# Patient Record
Sex: FEMALE | Race: WHITE
[De-identification: names, ages, dates, MRNs, and addresses within clinical notes are randomized per-mention and may not be internally consistent; named-entity substitution may affect disease eponyms.]

---

## 2018-12-18 ENCOUNTER — HOSPITAL ENCOUNTER (EMERGENCY)
Dept: HOSPITAL 10 - FTE | Age: 38
LOS: 1 days | Discharge: HOME | End: 2018-12-19
Payer: MEDICAID

## 2018-12-18 ENCOUNTER — HOSPITAL ENCOUNTER (EMERGENCY)
Dept: HOSPITAL 91 - FTE | Age: 38
LOS: 1 days | Discharge: HOME | End: 2018-12-19
Payer: MEDICAID

## 2018-12-18 VITALS
WEIGHT: 214.51 LBS | WEIGHT: 214.51 LBS | BODY MASS INDEX: 39.47 KG/M2 | BODY MASS INDEX: 39.47 KG/M2 | HEIGHT: 62 IN | HEIGHT: 62 IN

## 2018-12-18 DIAGNOSIS — R10.2: ICD-10-CM

## 2018-12-18 DIAGNOSIS — N83.201: Primary | ICD-10-CM

## 2018-12-18 PROCEDURE — 80053 COMPREHEN METABOLIC PANEL: CPT

## 2018-12-18 PROCEDURE — 85610 PROTHROMBIN TIME: CPT

## 2018-12-18 PROCEDURE — 36415 COLL VENOUS BLD VENIPUNCTURE: CPT

## 2018-12-18 PROCEDURE — 85730 THROMBOPLASTIN TIME PARTIAL: CPT

## 2018-12-18 PROCEDURE — 76856 US EXAM PELVIC COMPLETE: CPT

## 2018-12-18 PROCEDURE — 81001 URINALYSIS AUTO W/SCOPE: CPT

## 2018-12-18 PROCEDURE — 85025 COMPLETE CBC W/AUTO DIFF WBC: CPT

## 2018-12-18 PROCEDURE — 83690 ASSAY OF LIPASE: CPT

## 2018-12-18 PROCEDURE — 81025 URINE PREGNANCY TEST: CPT

## 2018-12-18 PROCEDURE — 74176 CT ABD & PELVIS W/O CONTRAST: CPT

## 2018-12-18 PROCEDURE — 96374 THER/PROPH/DIAG INJ IV PUSH: CPT

## 2018-12-18 PROCEDURE — 76830 TRANSVAGINAL US NON-OB: CPT

## 2018-12-18 PROCEDURE — 76705 ECHO EXAM OF ABDOMEN: CPT

## 2018-12-18 PROCEDURE — 99285 EMERGENCY DEPT VISIT HI MDM: CPT

## 2018-12-19 VITALS — RESPIRATION RATE: 16 BRPM | DIASTOLIC BLOOD PRESSURE: 82 MMHG | HEART RATE: 73 BPM | SYSTOLIC BLOOD PRESSURE: 153 MMHG

## 2018-12-19 LAB
ADD MAN DIFF?: NO
ADD UMIC: YES
ALANINE AMINOTRANSFERASE: 19 IU/L (ref 13–69)
ALBUMIN/GLOBULIN RATIO: 1.2
ALBUMIN: 4.1 G/DL (ref 3.3–4.9)
ALKALINE PHOSPHATASE: 91 IU/L (ref 42–121)
ANION GAP: 11 (ref 5–13)
ASPARTATE AMINO TRANSFERASE: 14 IU/L (ref 15–46)
BASOPHIL #: 0 10^3/UL (ref 0–0.1)
BASOPHILS %: 0.3 % (ref 0–2)
BILIRUBIN,DIRECT: 0 MG/DL (ref 0–0.2)
BILIRUBIN,TOTAL: 0.2 MG/DL (ref 0.2–1.3)
BLOOD UREA NITROGEN: 15 MG/DL (ref 7–20)
CALCIUM: 9.3 MG/DL (ref 8.4–10.2)
CARBON DIOXIDE: 29 MMOL/L (ref 21–31)
CHLORIDE: 103 MMOL/L (ref 97–110)
CREATININE: 0.71 MG/DL (ref 0.44–1)
EOSINOPHILS #: 0.2 10^3/UL (ref 0–0.5)
EOSINOPHILS %: 1.6 % (ref 0–7)
GLOBULIN: 3.4 G/DL (ref 1.3–3.2)
GLUCOSE: 88 MG/DL (ref 70–220)
HEMATOCRIT: 39.2 % (ref 37–47)
HEMOGLOBIN: 12.7 G/DL (ref 12–16)
IMMATURE GRANS #M: 0.19 10^3/UL (ref 0–0.03)
IMMATURE GRANS % (M): 1.4 % (ref 0–0.43)
INR: 0.88
LIPASE: 68 U/L (ref 23–300)
LYMPHOCYTES #: 4 10^3/UL (ref 0.8–2.9)
LYMPHOCYTES %: 30.2 % (ref 15–51)
MEAN CORPUSCULAR HEMOGLOBIN: 28.2 PG (ref 29–33)
MEAN CORPUSCULAR HGB CONC: 32.4 G/DL (ref 32–37)
MEAN CORPUSCULAR VOLUME: 86.9 FL (ref 82–101)
MEAN PLATELET VOLUME: 10 FL (ref 7.4–10.4)
MONOCYTE #: 0.7 10^3/UL (ref 0.3–0.9)
MONOCYTES %: 5.3 % (ref 0–11)
NEUTROPHIL #: 8.1 10^3/UL (ref 1.6–7.5)
NEUTROPHILS %: 61.2 % (ref 39–77)
NUCLEATED RED BLOOD CELLS #: 0 10^3/UL (ref 0–0)
NUCLEATED RED BLOOD CELLS%: 0 /100WBC (ref 0–0)
PARTIAL THROMBOPLASTIN TIME: 27.5 SEC (ref 23–35)
PLATELET COUNT: 298 10^3/UL (ref 140–415)
POTASSIUM: 4 MMOL/L (ref 3.5–5.1)
PROTIME: 12 SEC (ref 11.9–14.9)
PT RATIO: 0.9
RED BLOOD COUNT: 4.51 10^6/UL (ref 4.2–5.4)
RED CELL DISTRIBUTION WIDTH: 13.5 % (ref 11.5–14.5)
SODIUM: 143 MMOL/L (ref 135–144)
TOTAL PROTEIN: 7.5 G/DL (ref 6.1–8.1)
UR ASCORBIC ACID: NEGATIVE MG/DL
UR BACTERIA: (no result) /HPF
UR BILIRUBIN (DIP): NEGATIVE MG/DL
UR BLOOD (DIP): (no result) MG/DL
UR CLARITY: (no result)
UR COLOR: YELLOW
UR GLUCOSE (DIP): NEGATIVE MG/DL
UR KETONES (DIP): NEGATIVE MG/DL
UR LEUKOCYTE ESTERASE (DIP): NEGATIVE LEU/UL
UR NITRITE (DIP): NEGATIVE MG/DL
UR PH (DIP): 5 (ref 5–9)
UR RBC: 2 /HPF (ref 0–5)
UR SPECIFIC GRAVITY (DIP): 1.02 (ref 1–1.03)
UR SQUAMOUS EPITHELIAL CELL: (no result) /HPF
UR TOTAL PROTEIN (DIP): NEGATIVE MG/DL
UR UROBILINOGEN (DIP): NEGATIVE MG/DL
UR WBC: 1 /HPF (ref 0–5)
WHITE BLOOD COUNT: 13.2 10^3/UL (ref 4.8–10.8)

## 2018-12-19 RX ADMIN — KETOROLAC TROMETHAMINE 1 MG: 15 INJECTION, SOLUTION INTRAMUSCULAR; INTRAVENOUS at 01:25

## 2018-12-19 RX ADMIN — THIAMINE HYDROCHLORIDE 1 MLS/HR: 100 INJECTION, SOLUTION INTRAMUSCULAR; INTRAVENOUS at 01:25

## 2018-12-19 NOTE — ERD
ER Documentation


Chief Complaint


Chief Complaint





RUQ RADIATES TO BACK; NO N/V/D/C; DENIES UTI S/S X2DAYS





HPI


Patient is a 38-year-old female with no significant past medical history 


presenting to the emergency department complaining of right upper quadrant pain 


intermittently for the past 5 days.  Pain is rated 8/10 in severity and 


ibuprofen provides relief.  Patient denies any nausea, vomiting, diarrhea, 


fevers, chills, or other symptoms at this time.





ROS


All systems reviewed and are negative except as per history of present illness.





Medications


Home Meds


Active Scripts


Ondansetron (Ondansetron Odt) 4 Mg Tab.rapdis, 4 MG PO Q6H PRN for NAUSEA AND/OR


VOMITING, #10 TAB


   Prov:HANS BENNETT PA-C         18


Naproxen* (Naprosyn*) 500 Mg Tablet, 500 MG PO BID PRN for PAIN AND/OR 


INFLAMMATION, #30 TAB


   Prov:HANS BENNETT PA-C         18


Hydrocodone/Acetaminophen (Norco 5-325 Tablet) 1 Each Tablet, 1 TAB PO Q6H PRN 


for PAIN, #7 TAB


   Prov:HANS BENNETT PA-C         18


Naproxen* (Naprosyn*) 500 Mg Tablet, 500 MG PO BID PRN for PAIN AND/OR 


INFLAMMATION, #30 TAB


   Prov:HANS THOMPSON         3/24/16


Reported Medications


Norethindrone-E.estradiol-Iron (Norethin-Estrad-Ferr 1-0.02 mg) 1 Each Tablet, 1


TAB PO DAILY


   3/23/16





Allergies


Allergies:  


Coded Allergies:  


     No Known Allergy (Unverified , 16)





PMhx/Soc


History of Surgery:  Yes ()


Anesthesia Reaction:  No


Hx Neurological Disorder:  No


Hx Respiratory Disorders:  No


Hx Cardiac Disorders:  No


Hx Psychiatric Problems:  No


Hx Miscellaneous Medical Probl:  No


Hx Alcohol Use:  No


Hx Substance Use:  No


Hx Tobacco Use:  No


Smoking Status:  Never smoker





FmHx


Family History:  No diabetes





Physical Exam


Vitals





Vital Signs


  Date      Temp  Pulse  Resp  B/P (MAP)   Pulse Ox  O2          O2 Flow    FiO2


Time                                                 Delivery    Rate


  18  98.3     73    16      153/82        97  Room Air


     04:23                          (105)


  18  98.3     68    19      135/79        96


     23:28                           (97)





Physical Exam


Const:   No acute distress


Head:   Atraumatic 


Eyes:    Normal Conjunctiva


ENT:    Normal External Ears, Nose and Mouth.


Neck:               Full range of motion. No meningismus.


Resp:   Clear to auscultation bilaterally


Cardio:   Regular rate and rhythm, no murmurs


Abd:    Patient has tenderness to palpation of the right upper quadrant, 


McBurney's point, right pelvic region.  No rebound tenderness or guarding.  


Active bowel sounds.


Skin:   No petechiae or rashes 


Back:   No midline or flank tenderness


Ext:    No cyanosis, or edema


Neur:   Awake and alert


Psych:    Normal Mood and Affect


Result Diagram:  


18 0122                                                                   


            18 0122





Results 24 hrs





Laboratory Tests


Test
                             18
01:14  18
01:19  18
01:22


Urine Color                      YELLOW


Urine Clarity
                   SLIGHTLY
CLOUDY  
               



Urine pH                                    5.0


Urine Specific Gravity                    1.019


Urine Ketones                    NEGATIVE mg/dL


Urine Nitrite                    NEGATIVE mg/dL


Urine Bilirubin                  NEGATIVE mg/dL


Urine Urobilinogen               NEGATIVE mg/dL


Urine Leukocyte Esterase
        NEGATIVE
Lani/ul  
               



Urine Microscopic RBC                    2 /HPF


Urine Microscopic WBC                    1 /HPF


Urine Squamous Epithelial
Cells  FEW /HPF 
       
               



Urine Bacteria                   FEW /HPF


Urine Hemoglobin                       2+ mg/dL


Urine Glucose                    NEGATIVE mg/dL


Urine Total Protein              NEGATIVE mg/dl


POC Beta HCG, Qualitative                         NEGATIVE


White Blood Count                                                  13.2 10^3/ul


Red Blood Count                                                    4.51 10^6/ul


Hemoglobin                                                            12.7 g/dl


Hematocrit                                                               39.2 %


Mean Corpuscular Volume                                                 86.9 fl


Mean Corpuscular Hemoglobin                                             28.2 pg


Mean Corpuscular                 
                
                  32.4 g/dl 



Hemoglobin
Concent


Red Cell Distribution Width                                              13.5 %


Platelet Count                                                      298 10^3/UL


Mean Platelet Volume                                                    10.0 fl


Immature Granulocytes %                                                 1.400 %


Neutrophils %                                                            61.2 %


Lymphocytes %                                                            30.2 %


Monocytes %                                                               5.3 %


Eosinophils %                                                             1.6 %


Basophils %                                                               0.3 %


Nucleated Red Blood Cells %                                         0.0 /100WBC


Immature Granulocytes #                                           0.190 10^3/ul


Neutrophils #                                                       8.1 10^3/ul


Lymphocytes #                                                       4.0 10^3/ul


Monocytes #                                                         0.7 10^3/ul


Eosinophils #                                                       0.2 10^3/ul


Basophils #                                                         0.0 10^3/ul


Nucleated Red Blood Cells #                                         0.0 10^3/ul


Prothrombin Time                                                       12.0 Sec


Prothrombin Time Ratio                                                      0.9


INR International                
                
                       0.88 



Normalized
Ratio


Activated Partial
Thromboplast   
                
                   27.5 Sec 



Time


Sodium Level                                                         143 mmol/L


Potassium Level                                                      4.0 mmol/L


Chloride Level                                                       103 mmol/L


Carbon Dioxide Level                                                  29 mmol/L


Anion Gap                                                                    11


Blood Urea Nitrogen                                                    15 mg/dl


Creatinine                                                           0.71 mg/dl


Est Glomerular Filtrat           
                
               > 60 mL/min 



Rate
mL/min


Glucose Level                                                          88 mg/dl


Calcium Level                                                         9.3 mg/dl


Total Bilirubin                                                       0.2 mg/dl


Direct Bilirubin                                                     0.00 mg/dl


Indirect Bilirubin                                                    0.2 mg/dl


Aspartate Amino                  
                
                    14 IU/L 



Transf
(AST/SGOT)


Alanine                          
                
                    19 IU/L 



Aminotransferase
(ALT/SGPT)


Alkaline Phosphatase                                                    91 IU/L


Total Protein                                                          7.5 g/dl


Albumin                                                                4.1 g/dl


Globulin                                                              3.40 g/dl


Albumin/Globulin Ratio                                                     1.20


Lipase                                                                   68 U/L





Current Medications


 Medications
   Dose
          Sig/Jeny
       Start Time
   Status  Last


 (Trade)       Ordered        Route
 PRN     Stop Time              Admin
Dose


                              Reason                                Admin


 Sodium         1,000 ml @ 
   Q1H STAT
      18      DC          18


Chloride       1,000 mls/hr   IV
            01:06
                       01:25



                                             18


                                             02:05


 Ketorolac
     15 mg          ONCE  STAT
    18      DC          18


Tromethamine
                 IV
            01:06
                       01:25



 (Toradol)                                   18


                                             01:07


[place] [address]


                        Radiology Main Line: [phone]





                            DIAGNOSTIC IMAGING REPORT





Patient: OMID ALCAZAR   : 1980   Age: 38  Sex: F                      


 


       [facility] #:    [id number]   [id number]    DOS: 18 0218


Ordering MD: HANS BENNETT PA-C   Location:  [facility]   Room/Bed:             


                              


                                        


PROCEDURE:  US Pelvis Non-OB Abdominal


 


CLINICAL INDICATION:    Pelvic pain.  


 


TECHNIQUE: Sonographic imaging of the pelvis was performed using transabdominal 


and transvaginal techniques.  Grayscale and color Doppler was utilized.


 


COMPARISON:  CT abdomen and pelvis from the same day.


 


FINDINGS:


 


UTERUS:  Measures 9.5 x 3.8 x 6.2 cm without appreciated abnormality.


 


ENDOMETRIAL STRIPE:   0.4 cm in thickness.


 


RIGHT OVARY:  Measures 6.0 x 4.9 x 5.4 cm with 4.3 x 4.1 x 4.0 cm cyst again 


seen. Surrounding parenchymal vascular flow is demonstrated.


 


LEFT OVARY: Not identified.


 


FREE FLUID:  None.


 


IMPRESSION:


1.  4.3 x 4.1 x 4.0 cm right ovarian cyst is again seen with surrounding 


parenchymal vascular flow.


2.  The left ovary is not identified. 


 


RPTAT:HGST


_____________________________________________ 


Deven Robertson, Physician           Date    Time 


Electronically viewed and signed by Deven Robertson Physician on 2018 


03:42 


 


D:  2018 03:42  T:  2018 03:42


GT/





CC: HANS BENNETT PA-C





[id number]





                          [place] [address]


                        Radiology Main Line: [phone]





                            DIAGNOSTIC IMAGING REPORT





Patient: OMID ALCAZAR   : 1980   Age: 38  Sex: F                      


 


       MR #:    [id number]   Acct #:   [id number]    DOS: 18 0106


Ordering MD: HANS BENNETT PA-C   Location:  [facility]   Room/Bed:             


                              


                                        


PROCEDURE:  CT Abdo and Pelvis w/o IV Cont


 


CLINICAL INDICATION:    Abdominal pain.


 


TECHNIQUE: Contiguous axial imaging was performed through the abdomen and pelvis


without contrast.  Coronal and sagittal reformatting was utilized.  DICOM images


are available.  Lack of intravenous contrast causes limitation in evaluation of 


the solid organs and vasculature.  Lack of oral contrast causes limitation of 


the gastrointestinal tract.


 


CTDIvol:  22.69 mGy mGy.  Total Exam DLP:  1395.21 mGy.cm mGy-cm.  This CT exam 


was performed using one or more of the following dose reduction techniques: 


Automated exposure control, adjustment of the mA and/or kV according to patient 


size, use of iterative reconstruction technique.


 


COMPARISON:  Ultrasound of the right upper quadrant from the same day.


 


FINDINGS:


 


VISUALIZED LUNG BASES:  Appear clear.


 


LIVER:  Unremarkable.


 


SPLEEN:  Unremarkable.


 


PANCREAS:  Unremarkable.


 


GALLBLADDER:  Gallstones are again seen.


 


ADRENAL GLANDS:  Unremarkable.


 


RIGHT KIDNEY:  Unremarkable.


 


LEFT KIDNEY:  Unremarkable.


 


ADENOPATHY:  None.


 


VASCULATURE:  Unremarkable for this non-angiographic study.


 


GI TRACT:  There is no bowel dilatation to suggest obstruction.  No inflammatory


changes of the bowel are appreciated.


 


APPENDIX:  Unremarkable.


 


PELVIC STRUCTURES:  5.1 x 5.2 cm right ovarian cyst is present. Linear metallic 


density is seen along the left anterior margin of the uterine fundus.


 


OTHER SOFT TISSUES:  Unremarkable.


 


OSSEOUS STRUCTURES:  Unremarkable.


 


IMPRESSION:


1.  5.1 x 5.2 cm right ovarian cyst. Ultrasound may be helpful for further 


evaluation if warranted.


2.  Linear metallic density is seen along the left anterior margin of the 


uterine fundus. Correlation is suggested. Essure filament may be considered 


although this may reside outside the fallopian tube and no filament is seen on 


the right.


3.  Gallstones.


 


 


RPTAT:HGST


_____________________________________________ 


Deven Robertson Physician           Date    Time 


Electronically viewed and signed by Deven Robertson Physician on 2018 


02:09 


 


D:  2018 02:09  T:  2018 02:09


GT/





CC: HANS BENNETT PA-C





[id number]





                          [place] [address]


                        Radiology Main Line: [phone]





                            DIAGNOSTIC IMAGING REPORT





Patient: OMID ALCAZAR   : 1980   Age: 38  Sex: F                      


 


       MR #:    [id number]   Acct #:   [id number]    DOS: 18 0106


Ordering MD: HANS BENNETT PA-C   Location:  [facility]   Room/Bed:             


                              


                                        


PROCEDURE:   Ultrasound right upper quadrant


 


CLINICAL INDICATION:  Abdominal pain.  


 


TECHNIQUE:   Sonographic imaging of the right upper quadrant was performed with 


grayscale and color Doppler techniques.


 


COMPARISON: Subsequent CT abdomen and pelvis performed on the same day.


 


FINDINGS:


 


LIVER:  Measures 18.6 cm in length with echogenicity suggesting fatty infiltrat


ion. 


 


GALLBLADDER:  Gallstones are present.  There is no wall thickening or 


pericholecystic fluid.


 


COMMON BILE DUCT: Measures up to 0.4 cm. 


 


VISUALIZED PANCREAS:  Unremarkable.


 


RIGHT KIDNEY: Measures 11.0 cm in length without appreciated abnormality. 


 


VISUALIZED AORTA AND INFERIOR VENA CAVA:   Unremarkable.


 


IMPRESSION:


1.  Gallstones are present without additional sonographic evidence for acute 


cholecystitis.


2.  Mildly enlarged liver with suggestion of fatty infiltration. 


 


RPTAT:HGST


_____________________________________________ 


Deven Robertson Physician           Date    Time 


Electronically viewed and signed by Deven Robertson, Physician on 2018 


02:12 


 


D:  2018 02:12  T:  2018 02:12


GT/





CC: HANS BENNETT PA-C





[id number]


























Procedures/MDM


38-year-old female is presenting to the emergency department complaining of 


right pelvic, right lower quadrant and right upper quadrant pain.  Workup was m


ost consistent with cholelithiasis without evidence of cholecystitis.  The 


patient also had a large right-sided ovarian cyst.  Full reports from 


radiologist may be viewed above.  Patient's pain was significant the improved 


with IV Toradol.  





CBC:      no e/o of systemic infection or severe anemia


CMP:      no e/o severe acidosis, alkalosis, renal failure, diabetic 


ketoacidosis, liver disease


Lipase:               no e/o pancreatitis


PT/INR:   normal coagulation


Urine:      no e/o acute infection or hematuria








No evidence to suggest acute surgical abdomen, ovarian torsion, ectopic 


pregnancy, or other emergencies.  Patient agreed with the diagnosis, plan, need 


for follow-up, return precautions.





Patient's blood pressure was elevated (>120/80) but appears stable without 


evidence of hypertension emergency or urgency.  The patient is to follow-up and 


pursue outpatient monitoring and therapy with their primary care physician 


within 1 week and return immediately if they have any new, worsening, or 


concerning symptoms.





Disclaimer: Inadvertent spelling and grammatical errors are likely due to 


EHR/dictation software use and do not reflect on the overall quality of patient 


care. Also, please note that the electronic time recorded on this note does not 


necessarily reflect the actual time of the patient encounter.





Departure


Diagnosis:  


   Primary Impression:  


   Ovarian cyst


   Laterality:  right  Qualified Codes:  N83.201 - Unspecified ovarian cyst, 


   right side


Condition:  Fair


Patient Instructions:  What Are Ovarian Cysts?


Referrals:  


[facility]  ([facility]


Usted se ha hecho un examen mdico de control que le indica que no est en marian 


condicin que requiera tratamiento urgente en el Departamento de Emergencia. Un 


estudio ms profundo y el tratamiento de martin condicin pueden esperar sin ningn 


riesgo hasta que usted sea atendida/o en el consultorio de martin mdico o marian 


clnica. Es responsabilidad suya arreglar marian bettie para el seguimiento del benson.








MANEJO DE CONDICIONES NO URGENTES EN EL FUTURO


1) Si usted tiene un mdico de atencin primaria:





Usted debera llamar a martin mdico de atencin primaria antes de venir al 


departamento de emergencia. Despus de las horas de consultorio, martin doctor o martin 


asociado/a est disponible por telfono. El mdico o enfermero de charity en el 


servicio telefnico puede asesorarle por rm medio para atender el problema, o 


benson contrario se puede programar marian bettie.





2) Si usted no tiene un mdico de atencin primaria:


Llame al mdico o clnica de referencia que aparece abajo christine las horas de 


consultorio para hacer marian bettie para que le vean.





CLINICAS:


[facility]  [phone] [address] [facility]  [phone] [address] [facility] [phone] [address] [facility]  [phone] [address] [place] [address] [phone] [facility]. [phone] 


[address] [place]








OB/GYN REFERRAL LIST


JAMILA FRAZIER MD


[address] [facility] 


[address] [place] [address]


[phone] OFFICE FAX [phone]





, [place]


[address] [place] [address]


[phone]





DR. LA[address], [place]


[address] [place] [address]


[phone]





[facility], [facility]


[address] [facility], [address], [place] [address]


[phone]





DR [facility], [facility]


[address] [facility], [place] [address]


[phone]





CL[place]


[address] [place] [address]


[phone] [address] [facility] [address]


[phone]  [phone]





DR LEDEZMA, ONOFRE


[address], [place] [address]


[phone]





DR CLAY, ALAN


[address] [facility]. [address], [place] [address]


[phone]





DR LUGO, FARID


[address] [place], [place] [address] [phone]





Additional Instructions:  


Llame al doctor MAANA y wilian marian BETTIE PARA DENTRO DE 1-2 TURPIN.Dgale a la 


secretaria que nosotros le instruimos hacer esta bettie.Avise o llame si martin 


condicin se empeora antes de la bettie. Regresa aqui si peor o no mejor.











HANS BENNETT PA-C       Dec 19, 2018 04:48

## 2019-03-08 ENCOUNTER — HOSPITAL ENCOUNTER (EMERGENCY)
Dept: HOSPITAL 10 - FTE | Age: 39
Discharge: HOME | End: 2019-03-08
Payer: MEDICAID

## 2019-03-08 ENCOUNTER — HOSPITAL ENCOUNTER (EMERGENCY)
Dept: HOSPITAL 91 - FTE | Age: 39
Discharge: HOME | End: 2019-03-08
Payer: MEDICAID

## 2019-03-08 VITALS — SYSTOLIC BLOOD PRESSURE: 128 MMHG | RESPIRATION RATE: 18 BRPM | DIASTOLIC BLOOD PRESSURE: 62 MMHG | HEART RATE: 66 BPM

## 2019-03-08 VITALS — WEIGHT: 163.14 LBS | BODY MASS INDEX: 37.76 KG/M2 | HEIGHT: 55 IN

## 2019-03-08 DIAGNOSIS — K21.9: Primary | ICD-10-CM

## 2019-03-08 LAB
URINE BLOOD (DIP) POC: (no result)
URINE PH (DIP) POC: 6 (ref 5–8.5)

## 2019-03-08 PROCEDURE — 81003 URINALYSIS AUTO W/O SCOPE: CPT

## 2019-03-08 PROCEDURE — 99284 EMERGENCY DEPT VISIT MOD MDM: CPT

## 2019-03-08 PROCEDURE — 81025 URINE PREGNANCY TEST: CPT

## 2019-03-08 PROCEDURE — 96372 THER/PROPH/DIAG INJ SC/IM: CPT

## 2019-03-08 RX ADMIN — KETOROLAC TROMETHAMINE 1 MG: 30 INJECTION, SOLUTION INTRAMUSCULAR at 10:40

## 2019-03-08 NOTE — ERD
ER Documentation


Chief Complaint


Chief Complaint





ABD PAIN X 4 DAYS





HPI


38-year-old female, presents to the emergency department, complaining of diffuse


abdominal pain for 4 days, associated with distention and acid reflux.  The 


patient denies nausea, no vomiting, no fever or chills.





ROS


All systems reviewed and are negative except as per history of present illness.





Medications


Home Meds


Active Scripts


Omeprazole* (Omeprazole*) 40 Mg Capsule.dr, 40 MG PO DAILY, #30 CAP


   Prov:JAIMIE LOUIS MD         3/8/19


Simethicone (Simethicone) 180 Mg Capsule, 180 MG PO TID PRN for 


DISTENSION/GAS/BLOATING, #20 CAP


   Prov:JAIMIE LOUIS MD         3/8/19


Ondansetron (Ondansetron Odt) 4 Mg Tab.rapdis, 4 MG PO Q6H PRN for NAUSEA AND/OR


VOMITING, #10 TAB


   Prov:HANS BENNETT PA-C         18


Naproxen* (Naprosyn*) 500 Mg Tablet, 500 MG PO BID PRN for PAIN AND/OR 


INFLAMMATION, #30 TAB


   Prov:HANS BENNETT PA-C         18


Hydrocodone/Acetaminophen (Norco 5-325 Tablet) 1 Each Tablet, 1 TAB PO Q6H PRN 


for PAIN, #7 TAB


   Prov:HANS BENNETT PA-C         18


Naproxen* (Naprosyn*) 500 Mg Tablet, 500 MG PO BID PRN for PAIN AND/OR 


INFLAMMATION, #30 TAB


   Prov:HANS THOMPSON         3/24/16


Reported Medications


Norethindrone-E.estradiol-Iron (Norethin-Estrad-Ferr 1-0.02 mg) 1 Each Tablet, 1


TAB PO DAILY


   3/23/16





Allergies


Allergies:  


Coded Allergies:  


     No Known Allergy (Unverified , 16)





PMhx/Soc


History of Surgery:  Yes ()


Anesthesia Reaction:  No


Hx Neurological Disorder:  No


Hx Respiratory Disorders:  No


Hx Cardiac Disorders:  No


Hx Psychiatric Problems:  No


Hx Miscellaneous Medical Probl:  No


Hx Alcohol Use:  No


Hx Substance Use:  No


Hx Tobacco Use:  No





FmHx


Family History:  No diabetes, No coronary disease





Physical Exam


Vitals





Vital Signs


  Date      Temp  Pulse  Resp  B/P (MAP)   Pulse Ox  O2          O2 Flow    FiO2


Time                                                 Delivery    Rate


    3/8/19  98.1     81    18      130/65        99


     08:15                           (86)





Physical Exam


Const:   No acute distress


Head:   Atraumatic 


Eyes:    Normal Conjunctiva


ENT:    Normal External Ears, Nose and Mouth.


Neck:               Full range of motion. No meningismus.


Resp:   Clear to auscultation bilaterally


Cardio:   Regular rate and rhythm, no murmurs


Abd:    Soft, non tender, non distended. Normal bowel sounds


Skin:   No petechiae or rashes


Back:   No midline or flank tenderness


Ext:    No cyanosis, or edema


Neur:   Awake and alert


Psych:    Normal Mood and Affect


Results 24 hrs





Laboratory Tests


        Test
                                3/8/19
10:21  3/8/19
10:37


        POC Beta HCG, Qualitative            NEGATIVE


        Bedside Urine pH (LAB)                                     6.0


        Bedside Urine Protein (LAB)                        Trace


        Bedside Urine Glucose (UA)                         Negative


        Bedside Urine Ketones (LAB)                        Negative


        Bedside Urine Blood                                         1+


        Bedside Urine Nitrite (LAB)                        Negative


        Bedside Urine Leukocyte
Esterase (L  
             Negative 






Current Medications


 Medications
   Dose
          Sig/Jeny
       Start Time
   Status  Last


 (Trade)       Ordered        Route
 PRN     Stop Time              Admin
Dose


                              Reason                                Admin


 Ketorolac
     30 mg          ONCE  STAT
    3/8/19        DC            3/8/19


Tromethamine
                 IM
            10:35
 3/8/19                10:40



 (Toradol)                                   10:36








Procedures/MDM


Vital signs stable. Differential diagnosis include but not limited to: 


Gastritis, gastroenteritis, cholelithiasis, cholecystitis, kidney stones, 


irritable bowel syndrome, inflammatory bowel syndrome, malabsorption syndrome, 


food intolerance, medication side effect, pancreatitis, diverticulitis, bowel 


obstruction.  


Physical examination and clinical presentation consistent most likely with acute


gastritis.


During the ED course the patient remained stable, no new complaints. 


Results and clinical impression discussed with the patient who agrees with 


management. The patient is stable to be treated outpatient and will be 


discharged home with a Rx for simethicone and omeprazole, some side effects of 


prescribed medications (headache, rash, nausea, vomiting, diarrhea, drowsiness, 


habituation, bleeding, hypertension, interactions with other medications) were 


reviewed.





Follow up with the primary care provider in the next 48h is recommended.  If 


symptoms persist, worsen or new symptoms develop, then patient should return to 


the ED immediately.





Instructions explained and given directly by me to the patient with 


acknowledgment and demonstrated understanding.





Disclaimer: Inadvertent spelling and grammatical errors are likely due to 


EHR/dictation software use and do not reflect on the overall quality of patient 


care. Also, please note that the electronic time recorded on this note does not 


necessarily reflect the actual time of the patient encounter.





Departure


Diagnosis:  


   Primary Impression:  


   GERD (gastroesophageal reflux disease)


Condition:  Stable





Additional Instructions:  


Muchas jennifer por [facility] para martin servicio.





Esperamos que en martin visita a la manjinder de emergencia martin problema medico haya sido 


solucionado y que se sienta mucho mejor. 





Para estar seguros que martin mejoria sigue en proceso, le pedimos el favor de hacer


marian steven de seguimiento medico con martin doctor primario en los proximos 2-4 lerner.





Lleve con usted estos documentos y las medicinas recetadas.





Si george sintomas empeoran, NO SE ESPERE, por favor regrese a manjinder de emergencia 


INMEDIATAMENTE.





En benson que usted no tenga un mdico de atencin primaria:


Llame al mdico o clnica comunitaria de referencia que aparece abajo christine 


las horas de consultorio para hacer marian steven para que le vean.





CLINICAS:


[facility]  [phone] [address] [facility]  [phone] [address] [facility] [phone]


[address] [facility]  [phone]


[address] [facility]   [phone] [address] [facility] [phone] 


[address] JAIMIE LINO MD       Mar 8, 2019 09:46